# Patient Record
Sex: FEMALE | Race: OTHER
[De-identification: names, ages, dates, MRNs, and addresses within clinical notes are randomized per-mention and may not be internally consistent; named-entity substitution may affect disease eponyms.]

---

## 2020-08-09 ENCOUNTER — HOSPITAL ENCOUNTER (EMERGENCY)
Dept: HOSPITAL 72 - EMR | Age: 25
Discharge: HOME | End: 2020-08-09
Payer: COMMERCIAL

## 2020-08-09 VITALS — BODY MASS INDEX: 23.04 KG/M2 | WEIGHT: 130 LBS | HEIGHT: 63 IN

## 2020-08-09 VITALS — SYSTOLIC BLOOD PRESSURE: 110 MMHG | DIASTOLIC BLOOD PRESSURE: 70 MMHG

## 2020-08-09 VITALS — SYSTOLIC BLOOD PRESSURE: 118 MMHG | DIASTOLIC BLOOD PRESSURE: 78 MMHG

## 2020-08-09 DIAGNOSIS — Z88.6: ICD-10-CM
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
## 2020-08-09 DIAGNOSIS — Z97.5: ICD-10-CM
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
## 2020-08-09 DIAGNOSIS — S09.90XA: ICD-10-CM

## 2020-08-09 DIAGNOSIS — S01.01XA: Primary | ICD-10-CM

## 2020-08-09 PROCEDURE — 99283 EMERGENCY DEPT VISIT LOW MDM: CPT

## 2020-08-09 NOTE — NUR
ED Nurse Note:



Patient walked in from home d/t head injury that occurred at 1730 today, per 
patient she was doing yoga and fell on her head, laceration noted on right 
medial side of head approximately 1 inch in length. Patient aao x 4 and 
ambulatory with steady gait. Patient reports she does not remember what 
occurred after the fall and she feels delayed in thinking than normal. Aching 
pain 3/10, nonradiating. Patient stable upon assessment.

## 2020-08-09 NOTE — EMERGENCY ROOM REPORT
History of Present Illness


General


Chief Complaint:  Laceration


Source:  Patient





Present Illness


HPI


Disclaimer: Please note that this report is being documented using [employer]AG[employer] 

technology. This can lead to erroneous entry secondary to incorrect 

interpretation by the dictating instrument.





HPI: 25-year-old female presents after a mechanical fall.  She was doing a yoga 

pose when she fell forward striking her head.  Questionable loss of 

consciousness.  She denies any nausea or vomiting.  But sustained a laceration 

to the top of the scalp.  She denies any medical history.  She is currently not 

on any medications.


Allergies:  


Coded Allergies:  


     IBUPROFEN (Verified  Allergy, Unknown, 8/9/20)





COVID-19 Screening


Contact w/high risk pt:  No


Experienced COVID-19 symptoms?:  No


COVID-19 Testing performed PTA:  No





Patient History


Last Menstrual Period:  08/2020; has IUD


Reviewed Nursing Documentation:  PMH: Agreed; PSxH: Agreed





Review of Systems


All Other Systems:  negative except mentioned in HPI





Physical Exam





Vital Signs








  Date Time  Temp Pulse Resp B/P (MAP) Pulse Ox O2 Delivery O2 Flow Rate FiO2


 


8/9/20 21:00 98.2 100 15 102/64 (77) 97 Room Air  








Sp02 EP Interpretation:  reviewed, normal


General Appearance:  well appearing, no apparent distress


Head:  normocephalic, other - 3 cm laceration to the scalp, no depressions


Eyes:  bilateral eye PERRL, bilateral eye EOMI


ENT:  hearing grossly normal, moist mucus membranes


Neck:  full range of motion, supple, other - No midline tenderness


Respiratory:  lungs clear, normal breath sounds, no rhonchi, no respiratory 

distress, no retraction, no wheezing


Cardiovascular #1:  normal peripheral pulses, regular rate, rhythm, no murmur


Gastrointestinal:  non tender, soft, non-distended, no guarding


Neurologic:  alert, motor strength/tone normal, CNs III-XII nml as tested, 

oriented x3, cerebellar normal, normal gait, no focal defects


Skin:  normal color, warm/dry





Procedures


Laceration/Wound Repair


Laceration/Wound Repair :  


   Consent:  Verbal


   Wound Location:  head


   Wound's Depth, Shape:  superficial, linear


   Wound Explored:  clean


   Wound Repaired With:  staples


   Patient Tolerated:  Well


   Complications:  None





Medical Decision Making


Diagnostic Impression:  


 Primary Impression:  


 Closed head injury


 Additional Impression:  


 Scalp laceration


ER Course


Patient presented after a fall from a yoga pose.  She was neurologically intact 

on exam in no acute distress.  She did sustain a laceration of the scalp which 

was repaired by me.  Currently have low suspicion for skull fracture or 

intracranial hemorrhage.  I will discharge patient with return precautions and 

return in 1 week for staple removal.  Return sooner for any worsening symptoms.

  Discussed doing a CT scan of the brain but at this time we will defer as she 

is neurologically intact.





Last Vital Signs








  Date Time  Temp Pulse Resp B/P (MAP) Pulse Ox O2 Delivery O2 Flow Rate FiO2


 


8/9/20 21:11 98.2 72 18 110/70 98 Room Air  








Disposition:  HOME, SELF-CARE


Condition:  Improved











Paulino Fernandez M.D. Aug 9, 2020 21:31

## 2020-08-09 NOTE — NUR
ER DISCHARGE NOTE:



Patient is cleared to be discharged per ERMD, pt is aox4, on room air, with 
stable vital signs. pt was given dc instructions, pt was able to verbalize 
understanding, pt id band removed. pt is able to ambulate with steady gait. pt 
took all belongings. pt stable upon discharge.

## 2020-08-17 ENCOUNTER — HOSPITAL ENCOUNTER (EMERGENCY)
Dept: HOSPITAL 72 - EMR | Age: 25
Discharge: HOME | End: 2020-08-17
Payer: MEDICAID

## 2020-08-17 VITALS — HEIGHT: 63 IN | WEIGHT: 130 LBS | BODY MASS INDEX: 23.04 KG/M2

## 2020-08-17 VITALS — SYSTOLIC BLOOD PRESSURE: 110 MMHG | DIASTOLIC BLOOD PRESSURE: 66 MMHG

## 2020-08-17 VITALS — SYSTOLIC BLOOD PRESSURE: 108 MMHG | DIASTOLIC BLOOD PRESSURE: 67 MMHG

## 2020-08-17 DIAGNOSIS — Z88.6: ICD-10-CM

## 2020-08-17 DIAGNOSIS — Z48.02: Primary | ICD-10-CM

## 2020-08-17 PROCEDURE — 99281 EMR DPT VST MAYX REQ PHY/QHP: CPT

## 2020-08-17 NOTE — EMERGENCY ROOM REPORT
History of Present Illness


General


Chief Complaint:  Wound Recheck/Suture Removal


Source:  Patient





Present Illness


HPI


The patient presents for removal of staples from her scalp.  The patient has 

staples secondary to a scalp laceration.  She is requesting removal.  She 

states she is feeling well with no complaints.  She has no pain.


Allergies:  


Coded Allergies:  


     IBUPROFEN (Verified  Allergy, Unknown, 8/9/20)





COVID-19 Screening


Contact w/high risk pt:  No


Experienced COVID-19 symptoms?:  No


COVID-19 Testing performed PTA:  Yes


COVID-19 Screening:  Negative COVID-19


COVID-19 Testing Source:  08/16/20





Patient History


Past Medical History:  other - G^PD


Social History:  Denies: smoking, alcohol use, drug use


Pregnant Now:  No


Reviewed Nursing Documentation:  PMH: Agreed; PSxH: Agreed





Nursing Documentation-PMH


Past Medical History:  No History, Except For





Review of Systems


All Other Systems:  negative except mentioned in HPI





Physical Exam





Vital Signs








  Date Time  Temp Pulse Resp B/P (MAP) Pulse Ox O2 Delivery O2 Flow Rate FiO2


 


8/17/20 10:44 98.6 80 17 110/66 (81) 98 Room Air  








Sp02 EP Interpretation:  reviewed, normal


General Appearance:  no apparent distress, alert, GCS 15, non-toxic


Head:  normocephalic, other - 3 staples in place, healed scalp laceration.


Eyes:  bilateral eye normal inspection, bilateral eye PERRL


ENT:  hearing grossly normal, normal voice


Neck:  normal inspection


Respiratory:  no respiratory distress, no retraction, no accessory muscle use, 

speaking full sentences


Rectal:  deferred


Musculoskeletal:  back normal, normal range of motion, gait/station normal, non-

tender


Neurologic:  alert, motor strength/tone normal, oriented x3, sensory intact, 

responsive, speech normal


Psychiatric:  judgement/insight normal, memory normal, mood/affect normal, no 

suicidal/homicidal ideation


Skin:  other - See above in Head exam.





Medical Decision Making


Diagnostic Impression:  


 Primary Impression:  


 Encounter for removal of sutures


 Additional Impression:  


 Encounter for post-traumatic wound check


ER Course


The patient's staples were removed without complication or incident in a 

standard manner.  The patient's wound is healed without evidence of infection.  

The patient has no complaints.  The patient is given close return precautions 

and follow instructions.





Last Vital Signs








  Date Time  Temp Pulse Resp B/P (MAP) Pulse Ox O2 Delivery O2 Flow Rate FiO2


 


8/17/20 10:44 98.6 80 17 110/66 98 Room Air  








Status:  improved


Disposition:  HOME, SELF-CARE


Condition:  Improved


Patient Instructions:  Wound Check











Ivet Sims DO Aug 17, 2020 11:13